# Patient Record
Sex: MALE | Race: WHITE
[De-identification: names, ages, dates, MRNs, and addresses within clinical notes are randomized per-mention and may not be internally consistent; named-entity substitution may affect disease eponyms.]

---

## 2019-11-06 ENCOUNTER — HOSPITAL ENCOUNTER (OUTPATIENT)
Dept: HOSPITAL 52 - LL.DI | Age: 74
End: 2019-11-06
Attending: PHYSICIAN ASSISTANT
Payer: COMMERCIAL

## 2019-11-06 DIAGNOSIS — M25.511: Primary | ICD-10-CM

## 2019-11-06 DIAGNOSIS — G89.29: ICD-10-CM

## 2019-11-06 DIAGNOSIS — M19.011: ICD-10-CM

## 2021-05-10 ENCOUNTER — HOSPITAL ENCOUNTER (EMERGENCY)
Dept: HOSPITAL 52 - LL.ED | Age: 76
Discharge: HOME | End: 2021-05-10
Payer: OTHER GOVERNMENT

## 2021-05-10 DIAGNOSIS — Z23: ICD-10-CM

## 2021-05-10 DIAGNOSIS — Z79.82: ICD-10-CM
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
## 2021-05-10 DIAGNOSIS — Z79.899: ICD-10-CM

## 2021-05-10 DIAGNOSIS — S51.811A: Primary | ICD-10-CM

## 2021-05-10 RX ADMIN — NEOMYCIN AND POLYMYXIN B SULFATES AND BACITRACIN ZINC ONE: 400; 3.5; 5 OINTMENT TOPICAL at 10:53

## 2021-05-10 RX ADMIN — NEOMYCIN AND POLYMYXIN B SULFATES AND BACITRACIN ZINC ONE EACH: 400; 3.5; 5 OINTMENT TOPICAL at 10:52

## 2021-05-10 RX ADMIN — TETANUS TOXOID, REDUCED DIPHTHERIA TOXOID AND ACELLULAR PERTUSSIS VACCINE, ADSORBED ONE ML: 5; 2.5; 8; 8; 2.5 SUSPENSION INTRAMUSCULAR at 10:36

## 2021-05-10 NOTE — EDM.PDOC
ED HPI GENERAL MEDICAL PROBLEM





- General


Chief Complaint: Upper Extremity Injury/Pain


Stated Complaint: right arm injury


Time Seen by Provider: 05/10/21 09:07


Source of Information: Reports: Patient


History Limitations: Reports: No Limitations





- History of Present Illness


INITIAL COMMENTS - FREE TEXT/NARRATIVE: 





Cut right forearm with new  around 11:30 last night.  Wife had him 

come in today to get the wound checked as it was "kind of deep". 


Treatments PTA: Reports: Dressing(s)


  ** Right Arm


Pain Score (Numeric/FACES): 3





- Related Data


                                    Allergies











Allergy/AdvReac Type Severity Reaction Status Date / Time


 


No Known Allergies Allergy   Verified 05/10/21 08:49











Home Meds: 


                                    Home Meds





Aspirin 325 mg PO DAILY 03/17/18 [History]


Meloxicam 15 mg PO DAILY 03/17/18 [History]


Pramipexole [Mirapex] 1 mg PO BID 03/17/18 [History]


Rosuvastatin [Crestor] 20 mg PO DAILY 03/17/18 [History]


Omeprazole 20 mg PO DAILY 05/10/21 [History]











Past Medical History


HEENT History: Reports: Impaired Vision





Review of Systems





- Review of Systems


Review Of Systems: See Below


Constitutional: Reports: No Symptoms


Skin: Reports: Other (right arm laceration)


Neurological: Reports: No Symptoms.  Denies: Numbness, Tingling, Weakness


Psychiatric: Reports: No Symptoms





ED EXAM, GENERAL





- Physical Exam


Exam: See Below


General Appearance: Alert, WD/WN, No Apparent Distress


Eye Exam: Bilateral Eye: EOMI, PERRL


Ears: Hearing Grossly Normal


Head: Atraumatic, Normocephalic


Neck: Supple


Respiratory/Chest: No Respiratory Distress


Extremities: Other (2.5 cm laceration inner right forearm/mid section   No 

deformity. Tendon function intact. )


Neurological: Alert, Oriented, Normal Cognition, Normal Gait, No Motor/Sensory 

Deficits


Psychiatric: Normal Affect, Normal Mood


Skin Exam: Warm





ED TRAUMA EXTREMITY PROCEDURES





- Laceration/Wound Repair


  ** Right Lower Ventral Arm


Lac/Wound Length In cm: 2.5


Appearance: Subcutaneous, Linear, Clean


Distal NVT: Neuro & Vascular Intact, No Tendon Injury


Anesthetic Type: Local


Local Anesthesia - Lidocaine (Xylocaine): 1% Plain


Local Anesthetic Volume: 2cc


Skin Prep: Providone-Iodine (Betadine)


Exploration/Debridement/Repair: Wound Explored, In a Bloodless Field, No Foreign

 Material Found


Closed With: Sutures


Suture Size: 3-0


# of Sutures: 3


Suture Type: Nylon, Interrupted


Sterile Dressing Applied: Nurse


Tetanus Status Addressed: Yes


Complications: No





Course





- Vital Signs


Last Recorded V/S: 


                                Last Vital Signs











Temp  36.6 C   05/10/21 08:53


 


Pulse  58 L  05/10/21 08:53


 


Resp  18   05/10/21 08:53


 


BP  118/64   05/10/21 08:53


 


Pulse Ox  95   05/10/21 08:53














- Orders/Labs/Meds


Orders: 


                               Active Orders 24 hr











 Category Date Time Status


 


 Vaccines to be Administered [RC] PER UNIT ROUTINE Care  05/10/21 10:34 Active











Meds: 


Medications














Discontinued Medications














Generic Name Dose Route Start Last Admin





  Trade Name Chana  PRN Reason Stop Dose Admin


 


Diphtheria/Tetanus/Acell Pertussis  0.5 ml  05/10/21 10:33  05/10/21 10:36





  Diphtheria,Pertussis(Acell),Tetanus Vaccine 0.5 Ml Syringe  IM  05/10/21 10:34

  0.5 ml





  .ONCE ONE   Administration


 


Lidocaine HCl  5 ml  05/10/21 10:38  05/10/21 10:42





  Lidocaine 1% 5 Ml Sdv  INJECT  05/10/21 10:39  5 ml





  ONETIME ONE   Administration


 


Neomycin/Polymyxin/Bacitracin  1 each  05/10/21 10:51  05/10/21 10:52





  Bacitracin/Neomycin/Polymyxin B Oint 0.9 Gm U/D Packet  TOP  05/10/21 10:52  1

 each





  ONETIME ONE   Administration


 


Neomycin/Polymyxin/Bacitracin  Confirm  05/10/21 10:51  05/10/21 10:53





  Bacitracin/Neomycin/Polymyxin B Oint 0.9 Gm U/D Packet  Administered  05/10/21

 10:52  Not Given





  Dose  





  1 each  





  .ROUTE  





  .STK-MED ONE  














- Re-Assessments/Exams


Free Text/Narrative Re-Assessment/Exam: 





05/10/21 11:10


Wound less than 12 hours old.  Soaked extensively in Betadine solution. Three 

interrupted sutures placed for wound closure. Tetanus updated. Wound care 

reviewed. Signs of infection reviewed. To have sutures out next Monday. To 

follow up this week as needed if any concerns/signs of infection develop. 





Departure





- Departure


Time of Disposition: 11:04


Disposition: Home, Self-Care 01


Condition: Good


Clinical Impression: 


Forearm laceration


Qualifiers:


 Encounter type: initial encounter Laterality: right Qualified Code(s): S51.811A

 - Laceration without foreign body of right forearm, initial encounter








- Discharge Information


*COPY OF PRESCRIPTION DRUG MONITORING REPORT IN PATIENT TANNA: Not Applicable


Instructions:  Laceration Care, Adult, Easy-to-Read


Referrals: 


PCP,Unknown [Primary Care Provider] - 


Forms:  ED Department Discharge


Additional Instructions: 


Sutures out next Monday!  We take them out for no additional cost at the 

hospital clinic. Call and arrange time to come in.  Follow up as needed if you 

have any problems/concerns such as signs of infection as we discussed.  Call if 

you have questions.  





Sepsis Event Note (ED)





- Evaluation


Sepsis Screening Result: No Definite Risk





- Focused Exam


Vital Signs: 


                                   Vital Signs











  Temp Pulse Resp BP Pulse Ox


 


 05/10/21 08:53  36.6 C  58 L  18  118/64  95














- My Orders


Last 24 Hours: 


My Active Orders





05/10/21 10:34


Vaccines to be Administered [RC] PER UNIT ROUTINE 














- Assessment/Plan


Last 24 Hours: 


My Active Orders





05/10/21 10:34


Vaccines to be Administered [RC] PER UNIT ROUTINE

## 2021-09-18 ENCOUNTER — HOSPITAL ENCOUNTER (EMERGENCY)
Dept: HOSPITAL 52 - LL.ED | Age: 76
Discharge: HOME | End: 2021-09-18
Payer: OTHER GOVERNMENT

## 2021-09-18 DIAGNOSIS — Z79.899: ICD-10-CM

## 2021-09-18 DIAGNOSIS — U07.1: Primary | ICD-10-CM

## 2021-09-18 DIAGNOSIS — Z79.82: ICD-10-CM

## 2021-09-18 LAB
ANION GAP SERPL CALC-SCNC: 7.4 MEQ/L (ref 7–15)
CHLORIDE SERPL-SCNC: 105 MMOL/L (ref 98–107)
SODIUM SERPL-SCNC: 141 MMOL/L (ref 136–145)

## 2021-09-18 PROCEDURE — U0002 COVID-19 LAB TEST NON-CDC: HCPCS

## 2021-09-18 NOTE — EDM.PDOC
ED HPI GENERAL MEDICAL PROBLEM





- General


Chief Complaint: General


Stated Complaint: SOB, fevers


Time Seen by Provider: 09/18/21 18:45


Source of Information: Reports: Patient, Family


History Limitations: Reports: No Limitations





- History of Present Illness


INITIAL COMMENTS - FREE TEXT/NARRATIVE: 





He presents to the emergency department for evaluation of fever and not feeling 

well.  Been feeling very hot and measured a temperature of 99.8 at home.  He 

reports headache, nasal congestion, sore throat, cough with whitish sputum, 

diffuse body aches and fatigue.  No nausea, vomiting or diarrhea.  No dysuria, 

urgency or frequency.  Symptoms of been present for 3 days.  [facility]  He talked with them earlier this afternoon and was told to come to the 

ER.  He has a history of coronary artery disease.


  ** Generalized


Pain Score (Numeric/FACES): 5





- Related Data


                                    Allergies











Allergy/AdvReac Type Severity Reaction Status Date / Time


 


No Known Allergies Allergy   Verified 09/18/21 18:58











Home Meds: 


                                    Home Meds





Aspirin 325 mg PO DAILY 03/17/18 [History]


Meloxicam 15 mg PO DAILY 03/17/18 [History]


Pramipexole [Mirapex] 1 mg PO BID 03/17/18 [History]


Rosuvastatin [Crestor] 20 mg PO DAILY 03/17/18 [History]


Omeprazole 20 mg PO DAILY 05/10/21 [History]











Past Medical History


HEENT History: Reports: Hard of Hearing, Impaired Vision





ED ROS GENERAL





- Review of Systems


Review Of Systems: See Below


Constitutional: Reports: Fever, Chills


HEENT: Reports: Ear Pain, Nose Pain, Throat Pain


Respiratory: Reports: Shortness of Breath, Cough


Cardiovascular: Denies: Chest Pain, Palpitations


Endocrine: Reports: Fatigue


GI/Abdominal: Denies: Abdominal Pain, Diarrhea, Nausea, Vomiting


: Denies: Dysuria, Frequency, Urgency


Musculoskeletal: Reports: Muscle Pain


Skin: Reports: No Symptoms


Neurological: Denies: Confusion, Dizziness





ED EXAM, GENERAL





- Physical Exam


Exam: See Below


Exam Limited By: No Limitations


General Appearance: Alert, WD/WN, No Apparent Distress


Ears: Normal External Exam, Normal Canal, Hearing Grossly Normal, Normal TMs


Nose: Normal Inspection, Normal Mucosa, No Blood


Throat/Mouth: Normal Inspection, Normal Lips, Normal Teeth, Normal Gums, Normal 

Oropharynx, No Airway Compromise


Head: Atraumatic, Normocephalic


Neck: Supple.  No: Lymphadenopathy (L), Lymphadenopathy (R)


Respiratory/Chest: No Respiratory Distress, Lungs Clear, Normal Breath Sounds


Cardiovascular: Regular Rate, Rhythm, No Murmur


GI/Abdominal: Normal Bowel Sounds, Soft, Non-Tender


  ** #1 Interpretation


EKG Date: 09/18/21


Time: 20:00


EKG Interpretation Comments: 





Accelerated junctional rhythm.  No ST or T wave changes.  No apparent acute 

process.  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





Course





- Vital Signs


Last Recorded V/S: 


                                Last Vital Signs











Temp  37.1 C   09/18/21 17:56


 


Pulse  64   09/18/21 19:28


 


Resp  31 H  09/18/21 19:28


 


BP  136/63   09/18/21 19:28


 


Pulse Ox  91 L  09/18/21 19:28














- Orders/Labs/Meds


Orders: 


                               Active Orders 24 hr











 Category Date Time Status


 


 EKG Documentation Completion [RC] ASDIRECTED Care  09/18/21 19:46 Active


 


 Chest 1V Frontal [CR] Stat Exams  09/18/21 19:45 Taken


 


 Isolation [COMM] Routine Oth  09/18/21 18:58 Active











Labs: 


                                Laboratory Tests











  09/18/21 09/18/21 09/18/21 Range/Units





  18:57 19:10 19:10 


 


WBC   6.0   (4.0-10.2)  K/uL


 


RBC   3.89 L   (4.33-5.41)  M/uL


 


Hgb   11.8 L   (13.1-16.8)  g/dL


 


Hct   36.4 L   (39.0-49.0)  %


 


MCV   93.6   (84.0-98.0)  fL


 


MCH   30.3   (28.2-33.3)  pg


 


MCHC   32.4   (31.7-36.0)  g/dL


 


RDW   15.6 H   (11.2-14.1)  %


 


Plt Count   128 L   (150-350)  K/uL


 


Neut % (Auto)   77.4   (45.0-80.0)  %


 


Lymph % (Auto)   14.9   (10.0-50.0)  %


 


Mono % (Auto)   7.5   (2.0-14.0)  %


 


Eos % (Auto)   0.0   (0.0-5.0)  %


 


Baso % (Auto)   0.2   (0.0-2.0)  %


 


Neut # (Auto)   4.67   (1.40-7.00)  K/uL


 


Lymph # (Auto)   0.90   (0.50-3.50)  K/uL


 


Mono # (Auto)   0.45   (0.00-1.00)  K/uL


 


Eos # (Auto)   0.00   (0.00-0.50)  K/uL


 


Baso # (Auto)   0.01   (0.00-0.20)  K/uL


 


Sodium    141  (136-145)  mmol/L


 


Potassium    3.9  (3.5-5.1)  mmol/L


 


Chloride    105  ()  mmol/L


 


Carbon Dioxide    28.6  (21.0-32.0)  mmol/L


 


Anion Gap    7.4  (7-15)  meq/L


 


BUN    18  (7-18)  mg/dL


 


Creatinine    1.11  (0.51-1.17)  mg/dL


 


Est Cr Clr Drug Dosing    59.27  mL/min


 


Estimated GFR (MDRD)    > 60  mL/min


 


Glucose    86  (70-99)  mg/dL


 


Calcium    8.2 L  (8.5-10.1)  mg/dL


 


Total Bilirubin    0.9  (0.2-1.0)  mg/dL


 


AST    53 H  (15-37)  U/L


 


ALT    56  (12-78)  U/L


 


Alkaline Phosphatase    53  ()  IU/L


 


Troponin I High Sens     (<=76)  ng/L


 


NT-Pro-B Natriuret Pep     (0-125)  pg/mL


 


Total Protein    6.7  (6.4-8.2)  g/dL


 


Albumin    3.4  (3.4-5.0)  g/dL


 


SARS-CoV-2 RNA (NGA)  Positive H    (NEGATIVE)  














  09/18/21 Range/Units





  19:10 


 


WBC   (4.0-10.2)  K/uL


 


RBC   (4.33-5.41)  M/uL


 


Hgb   (13.1-16.8)  g/dL


 


Hct   (39.0-49.0)  %


 


MCV   (84.0-98.0)  fL


 


MCH   (28.2-33.3)  pg


 


MCHC   (31.7-36.0)  g/dL


 


RDW   (11.2-14.1)  %


 


Plt Count   (150-350)  K/uL


 


Neut % (Auto)   (45.0-80.0)  %


 


Lymph % (Auto)   (10.0-50.0)  %


 


Mono % (Auto)   (2.0-14.0)  %


 


Eos % (Auto)   (0.0-5.0)  %


 


Baso % (Auto)   (0.0-2.0)  %


 


Neut # (Auto)   (1.40-7.00)  K/uL


 


Lymph # (Auto)   (0.50-3.50)  K/uL


 


Mono # (Auto)   (0.00-1.00)  K/uL


 


Eos # (Auto)   (0.00-0.50)  K/uL


 


Baso # (Auto)   (0.00-0.20)  K/uL


 


Sodium   (136-145)  mmol/L


 


Potassium   (3.5-5.1)  mmol/L


 


Chloride   ()  mmol/L


 


Carbon Dioxide   (21.0-32.0)  mmol/L


 


Anion Gap   (7-15)  meq/L


 


BUN   (7-18)  mg/dL


 


Creatinine   (0.51-1.17)  mg/dL


 


Est Cr Clr Drug Dosing   mL/min


 


Estimated GFR (MDRD)   mL/min


 


Glucose   (70-99)  mg/dL


 


Calcium   (8.5-10.1)  mg/dL


 


Total Bilirubin   (0.2-1.0)  mg/dL


 


AST   (15-37)  U/L


 


ALT   (12-78)  U/L


 


Alkaline Phosphatase   ()  IU/L


 


Troponin I High Sens  33  (<=76)  ng/L


 


NT-Pro-B Natriuret Pep  215 H  (0-125)  pg/mL


 


Total Protein   (6.4-8.2)  g/dL


 


Albumin   (3.4-5.0)  g/dL


 


SARS-CoV-2 RNA (NGA)   (NEGATIVE)  














- Radiology Interpretation


Free Text/Narrative:: 





Portable chest x-ray shows normal cardiac silhouette.  No significant 

infiltrates.  No free air.





- Re-Assessments/Exams


Free Text/Narrative Re-Assessment/Exam: 


Patient was placed on oxygen and did feel his breathing improved, although he 

was saturating in the low 90s on arrival.  Because of his complaining of some 

tightness in the chest did check EKG and cardiac enzymes which were 

unremarkable.  A BNP was slightly elevated.  Covid test was positive.  Influenza

 was negative.  Labs were otherwise unremarkable.  Will discharge to home with 

home quarantine.





Departure





- Departure


Time of Disposition: 20:35


Disposition: Home, Self-Care 01


Condition: Good


Clinical Impression: 


 COVID-19








- Discharge Information


*PRESCRIPTION DRUG MONITORING PROGRAM REVIEWED*: Not Applicable


*COPY OF PRESCRIPTION DRUG MONITORING REPORT IN PATIENT TANNA: Not Applicable


Instructions:  COVID-19 Frequently Asked Questions


Forms:  ED Department Discharge


Care Plan Goals: 


Quarantine at home until symptoms have been gone for 1 week.


Push fluids.


Resume usual home medications.


Call or follow-up if symptoms are worsening.





Sepsis Event Note (ED)





- Evaluation


Sepsis Screening Result: No Definite Risk





- Focused Exam


Vital Signs: 


                                   Vital Signs











  Temp Pulse Resp BP Pulse Ox


 


 09/18/21 19:28   64  31 H  136/63  91 L


 


 09/18/21 17:56  37.1 C  68  28 H  117/52 L  92 L














- Problem List & Annotations


(1) COVID-19


SNOMED Code(s): 845066059


   Code(s): U07.1 - COVID-19   Status: Acute   Current Visit: Yes   





- My Orders


Last 24 Hours: 


My Active Orders





09/18/21 18:58


Isolation [COMM] Routine 





09/18/21 19:45


Chest 1V Frontal [CR] Stat 





09/18/21 19:46


EKG Documentation Completion [RC] ASDIRECTED 














- Assessment/Plan


Last 24 Hours: 


My Active Orders





09/18/21 18:58


Isolation [COMM] Routine 





09/18/21 19:45


Chest 1V Frontal [CR] Stat 





09/18/21 19:46


EKG Documentation Completion [RC] ASDIRECTED 











Plan: 





Quarantine at home until symptoms have been gone for 1 week.


Push fluids.


Resume usual home medications.


Call or follow-up if symptoms are worsening.

## 2021-09-20 ENCOUNTER — HOSPITAL ENCOUNTER (INPATIENT)
Dept: HOSPITAL 52 - LL.ACU | Age: 76
LOS: 1 days | Discharge: NURSING, REHAB OR LONG-TERM CARE | DRG: 178 | End: 2021-09-21
Attending: FAMILY MEDICINE | Admitting: FAMILY MEDICINE
Payer: OTHER GOVERNMENT

## 2021-09-20 DIAGNOSIS — I25.810: ICD-10-CM

## 2021-09-20 DIAGNOSIS — G47.30: ICD-10-CM

## 2021-09-20 DIAGNOSIS — R09.02: ICD-10-CM

## 2021-09-20 DIAGNOSIS — Z23: ICD-10-CM

## 2021-09-20 DIAGNOSIS — I25.2: ICD-10-CM

## 2021-09-20 DIAGNOSIS — F43.10: ICD-10-CM

## 2021-09-20 DIAGNOSIS — Z79.52: ICD-10-CM

## 2021-09-20 DIAGNOSIS — G25.81: ICD-10-CM

## 2021-09-20 DIAGNOSIS — U07.1: Primary | ICD-10-CM

## 2021-09-20 DIAGNOSIS — Z99.81: ICD-10-CM

## 2021-09-20 DIAGNOSIS — K21.9: ICD-10-CM

## 2021-09-20 DIAGNOSIS — E78.5: ICD-10-CM

## 2021-09-20 DIAGNOSIS — M79.7: ICD-10-CM

## 2021-09-20 DIAGNOSIS — H54.7: ICD-10-CM

## 2021-09-20 DIAGNOSIS — M19.90: ICD-10-CM

## 2021-09-20 DIAGNOSIS — H91.90: ICD-10-CM

## 2021-09-20 DIAGNOSIS — E66.9: ICD-10-CM

## 2021-09-20 DIAGNOSIS — Z79.82: ICD-10-CM

## 2021-09-20 DIAGNOSIS — I10: ICD-10-CM

## 2021-09-20 DIAGNOSIS — Z79.899: ICD-10-CM

## 2021-09-20 DIAGNOSIS — M33.20: ICD-10-CM

## 2021-09-20 LAB
ANION GAP SERPL CALC-SCNC: 8.4 MEQ/L (ref 7–15)
CHLORIDE SERPL-SCNC: 105 MMOL/L (ref 98–107)
SODIUM SERPL-SCNC: 140 MMOL/L (ref 136–145)

## 2021-09-20 PROCEDURE — 3E0DX3Z INTRODUCTION OF ANTI-INFLAMMATORY INTO MOUTH AND PHARYNX, EXTERNAL APPROACH: ICD-10-PCS | Performed by: FAMILY MEDICINE

## 2021-09-20 PROCEDURE — XW033F6 INTRODUCTION OF BAMLANIVIMAB MONOCLONAL ANTIBODY INTO PERIPHERAL VEIN, PERCUTANEOUS APPROACH, NEW TECHNOLOGY GROUP 6: ICD-10-PCS | Performed by: FAMILY MEDICINE

## 2021-09-20 PROCEDURE — XW033E5 INTRODUCTION OF REMDESIVIR ANTI-INFECTIVE INTO PERIPHERAL VEIN, PERCUTANEOUS APPROACH, NEW TECHNOLOGY GROUP 5: ICD-10-PCS | Performed by: FAMILY MEDICINE

## 2021-09-20 RX ADMIN — IPRATROPIUM BROMIDE AND ALBUTEROL SCH INHALATION: 20; 100 SPRAY, METERED RESPIRATORY (INHALATION) at 16:25

## 2021-09-20 RX ADMIN — FLUTICASONE PROPIONATE SCH SPRAY: 50 SPRAY, METERED NASAL at 19:39

## 2021-09-20 RX ADMIN — IPRATROPIUM BROMIDE AND ALBUTEROL SCH INHALATION: 20; 100 SPRAY, METERED RESPIRATORY (INHALATION) at 19:39

## 2021-09-20 NOTE — PCM.HP.2
H&P History of Present Illness





- General


Date of Service: 09/20/21


Admit Problem/Dx: 


                           Admission Diagnosis/Problem





Admission Diagnosis/Problem      Shortness of breath








Source of Information: Patient


History Limitations: Reports: No Limitations





- History of Present Illness


Initial Comments - Free Text/Narative: 





He was seen in the emergency department 3 nights ago and is positive for Covid. 

He has shortness of breath.  He came in today for monoclonal antibodies but his 

O2 sat was in the 70s.  In discussion with him his breathing does seem somewhat 

worse, especially at night.  Decision was made to admit to the hospital.  He 

does have a history of coronary artery disease and hypertension.  See ED note 

from September 17 for any additional details.





- Related Data


Allergies/Adverse Reactions: 


                                    Allergies











Allergy/AdvReac Type Severity Reaction Status Date / Time


 


No Known Allergies Allergy   Verified 09/18/21 18:58











Home Medications: 


                                    Home Meds





Aspirin 325 mg PO DAILY 03/17/18 [History]


Meloxicam 15 mg PO DAILY 03/17/18 [History]


Pramipexole [Mirapex] 1 mg PO BID 03/17/18 [History]


Rosuvastatin [Crestor] 20 mg PO DAILY 03/17/18 [History]


Omeprazole 20 mg PO DAILY 05/10/21 [History]


Budesonide/Formoterol Fumarate [Symbicort 80-4.5 MCG] 2 puff INH BEDTIME 

09/20/21 [History]


Fluticasone Propionate [Flovent] 2 spray NASBOTH BEDTIME 09/20/21 [History]


Isosorbide Mononitrate [Imdur] 30 mg PO DAILY 09/20/21 [History]


Nitroglycerin 1 tab SL ASDIRECTED PRN 09/20/21 [History]


amLODIPine [Norvasc] 10 mg PO DAILY 09/20/21 [History]


predniSONE [Prednisone] 7.5 mg PO DAILY 09/20/21 [History]











Past Medical History


HEENT History: Reports: Hard of Hearing, Impaired Vision


Cardiovascular History: Reports: MI


Respiratory History: Reports: Sleep Apnea, Other (See Below)


Other Respiratory History: wears CPAP at HS





- Past Surgical History


Cardiovascular Surgical History: Reports: Coronary Artery Bypass, Other (See 

Below)


Other Cardiovascular Surgeries/Procedures: open hrt surgery 4 bypass, MI 2019


Musculoskeletal Surgical History: Reports: Shoulder Surgery, Other (See Below)


Other Musculoskeletal Surgeries/Procedures:: 2021 revervse rotator cuff of right

 shoulder





H&P Review of Systems





- Review of Systems:


Review Of Systems: See Below


General: Denies: Fever, Chills


HEENT: Denies: Headaches, Visual Changes


Pulmonary: Reports: Shortness of Breath, Cough


Cardiovascular: Denies: Chest Pain, Palpitations


Gastrointestinal: Denies: Abdominal Pain, Nausea, Vomiting


Genitourinary: Denies: Dysuria, Frequency, Urgency


Neurological: Denies: Confusion, Dizziness, Headache





Exam





- Exam


Exam: See Below





- Exam


General: Alert, Oriented


HEENT: Conjunctiva Clear


Neck: Trachea Midline


Lungs: Other (Breathing is slightly decreased but good air movement throughout 

and clear to auscultation.)


Cardiovascular: Regular Rate, Regular Rhythm


GI/Abdominal Exam: Normal Bowel Sounds, Soft, Non-Tender, No Mass





- Patient Data


Result Diagrams: 


                                 09/20/21 14:20





                                 09/20/21 14:20





*Q Meaningful Use (ADM)





- VTE *Q


VTE Criteria *Q: 


01








- Problem List


(1) Coronary artery disease


SNOMED Code(s): 16321044


   ICD Code: I25.10 - ATHSCL HEART DISEASE OF NATIVE CORONARY ARTERY W/O ANG 

PCTRS   Status: Acute   Current Visit: Yes   





(2) Essential hypertension


SNOMED Code(s): 49575027


   ICD Code: I10 - ESSENTIAL (PRIMARY) HYPERTENSION   Status: Acute   Current 

Visit: Yes   





(3) COVID-19


SNOMED Code(s): 647862930


   ICD Code: U07.1 - COVID-19   Status: Acute   Current Visit: No   


Problem List Initiated/Reviewed/Updated: Yes


Orders Last 24hrs: 


                               Active Orders 24 hr











 Category Date Time Status


 


 Patient Status [ADT] Routine ADT  09/20/21 13:12 Ordered


 


 Antiembolic Devices [RC] .Routine Care  09/20/21 13:18 Ordered


 


 Height and Weight [RC] DAILY Care  09/20/21 13:11 Ordered


 


 Intake and Output [RC] QSHIFT Care  09/20/21 13:16 Ordered


 


 Oxygen Therapy [RC] CONTINUOUS Care  09/20/21 13:16 Ordered


 


 Pulse Oximetry [RC] CONTINUOUS Care  09/20/21 13:16 Ordered


 


 Up ad Mary [RC] ASDIRECTED Care  09/20/21 13:11 Ordered


 


 VTE/DVT Education [RC] PER UNIT ROUTINE Care  09/20/21 13:18 Ordered


 


 Vital Signs [RC] Q15M Care  09/20/21 11:30 Active


 


 Vital Signs [RC] Q4H Care  09/20/21 13:12 Ordered


 


 Regular Diet [DIET] Diet  09/20/21 Lunch Ordered


 


 BILIRUBIN DIRECT [CHEM] DAILY Lab  09/20/21 13:30 Ordered


 


 BILIRUBIN DIRECT [CHEM] DAILY Lab  09/21/21 13:30 Ordered


 


 BILIRUBIN DIRECT [CHEM] DAILY Lab  09/22/21 13:30 Ordered


 


 BILIRUBIN DIRECT [CHEM] DAILY Lab  09/23/21 13:30 Ordered


 


 BILIRUBIN DIRECT [CHEM] DAILY Lab  09/24/21 13:30 Ordered


 


 CBC WITH AUTO DIFF [HEME] Routine Lab  09/20/21 13:11 Ordered


 


 COMPREHENSIVE METABOLIC PN,CMP [CHEM] DAILY Lab  09/20/21 13:30 Ordered


 


 COMPREHENSIVE METABOLIC PN,CMP [CHEM] DAILY Lab  09/21/21 13:30 Ordered


 


 COMPREHENSIVE METABOLIC PN,CMP [CHEM] DAILY Lab  09/22/21 13:30 Ordered


 


 COMPREHENSIVE METABOLIC PN,CMP [CHEM] DAILY Lab  09/23/21 13:30 Ordered


 


 COMPREHENSIVE METABOLIC PN,CMP [CHEM] DAILY Lab  09/24/21 13:30 Ordered


 


 INR,PT,PROTHROMBIN TIME [COAG] Routine Lab  09/20/21 13:11 Ordered


 


 Acetaminophen [TylenoL] Med  09/20/21 13:11 Ordered





 650 mg PO Q4H PRN   


 


 Calcium Carbonate [Tums] Med  09/20/21 13:11 Ordered





 500 mg PO Q4H PRN   


 


 EPINEPHrine [Adrenalin] Med  09/20/21 11:30 Active





 0.3 mg IM ONETIME PRN   


 


 Enoxaparin [Lovenox] Med  09/20/21 13:15 Ordered





 40 mg SUBCUT Q12H   


 


 Famotidine [Pepcid] Med  09/20/21 11:30 Active





 20 mg IVPUSH ONETIME PRN   


 


 Remdesivir 200 mg Med  09/20/21 13:21 Ordered





 Sodium Chloride 0.9% [Normal Saline] 250 ml   





 IV ONETIME   


 


 Sodium Chloride 0.9% [Saline Flush] Med  09/20/21 11:30 Active





 30 ml FLUSH ASDIRECTED   


 


 dexAMETHasone Med  09/20/21 13:30 Ordered





 6 mg PO DAILY   


 


 diphenhydrAMINE [Benadryl] Med  09/20/21 11:30 Active





 50 mg IVPUSH ONETIME PRN   


 


 methylPREDNISolone Sod Succ [Solu-MEDROL] Med  09/20/21 11:30 Active





 125 mg IVPUSH ONETIME PRN   


 


 DVT/VTE Prophylaxis Reflex [OM.PC] Routine Oth  09/20/21 13:11 Ordered


 


 Resuscitation Status Routine Resus Stat  09/20/21 13:11 Ordered








                                Medication Orders





Diphenhydramine HCl (Diphenhydramine 50 Mg/Ml Sdv)  50 mg IVPUSH ONETIME PRN


   PRN Reason: hypersensitivity reaction


Epinephrine HCl (Epinephrine 1 Mg/1 Ml Amp)  0.3 mg IM ONETIME PRN


   PRN Reason: hypersensitivity reaction


Famotidine (Famotidine 20 Mg/2 Ml Sdv)  20 mg IVPUSH ONETIME PRN


   PRN Reason: hypersensitivity reaction


Methylprednisolone Sodium Succinate (Methylprednisolone Sodium Succinate 125 

Mg/2 Ml Sdv)  125 mg IVPUSH ONETIME PRN


   PRN Reason: hypersensitivity reaction


Sodium Chloride (Sodium Chloride 0.9% 10 Ml Syringe)  30 ml FLUSH ASDIRECTED FELICIANO








Assessment/Plan Comment:: 





He is admitted as an inpatient for supplemental oxygen.  Keep O2 sats above 90%.


We will start remdesivir 20 mg today then 100 mg daily.


Dexamethasone 6 mg daily.


Resume usual home medications, but will stop the Medrol he was started on 3 days

 ago.

## 2021-09-21 LAB
ANION GAP SERPL CALC-SCNC: 10.3 MEQ/L (ref 7–15)
CHLORIDE SERPL-SCNC: 106 MMOL/L (ref 98–107)
SODIUM SERPL-SCNC: 139 MMOL/L (ref 136–145)

## 2021-09-21 RX ADMIN — Medication SCH ML: at 20:11

## 2021-09-21 RX ADMIN — IPRATROPIUM BROMIDE AND ALBUTEROL SCH INHALATION: 20; 100 SPRAY, METERED RESPIRATORY (INHALATION) at 08:24

## 2021-09-21 RX ADMIN — FLUTICASONE PROPIONATE SCH: 50 SPRAY, METERED NASAL at 20:03

## 2021-09-21 RX ADMIN — Medication SCH ML: at 16:12

## 2021-09-21 RX ADMIN — IPRATROPIUM BROMIDE AND ALBUTEROL SCH INHALATION: 20; 100 SPRAY, METERED RESPIRATORY (INHALATION) at 12:13

## 2021-09-21 RX ADMIN — IPRATROPIUM BROMIDE AND ALBUTEROL SCH INHALATION: 20; 100 SPRAY, METERED RESPIRATORY (INHALATION) at 16:09

## 2021-09-21 NOTE — [id number]
**Discharge Summary





- Hospital Course


Brief History: Patient admitted for treatment of significant hypoxemia 

associated with acute Covid infection


Diagnosis: Stroke: No





- Discharge Data


Discharge Date: 09/21/21


Discharge Disposition: DC/Tfer to Acute Hospital 02


Condition: Good





- Referral to Home Health


Primary Care Physician: 


PCP None








- Discharge Diagnosis/Problem(s)


(1) COVID-19


SNOMED Code(s): 739334637


   ICD Code: U07.1 - COVID-19   Status: Acute   Priority: High   Current Visit: 

No   Problem Details: Acute Covid-19 infection. Receiving Dexamethasone. IV 

Bamlanivimab and Remdesivir.  Supplemental O2.    





(2) Hypoxemia requiring supplemental oxygen


SNOMED Code(s): 129171884


   ICD Code: R09.02 - HYPOXEMIA; Z99.81 - DEPENDENCE ON SUPPLEMENTAL OXYGEN   

Status: Acute   Priority: High   Current Visit: Yes   Problem Details: Receiving

15L via non-rebreather mask this morning.  Regular DuoNebs/Dexamethasone 

scheduled.    





(3) Essential hypertension


SNOMED Code(s): 69813881


   ICD Code: I10 - ESSENTIAL (PRIMARY) HYPERTENSION   Status: Chronic   

Priority: Medium   Current Visit: Yes   Problem Details: Has been running a bit 

lower BPs since admission.  /50s.  BP meds put on hold earlier today    





(4) Coronary artery disease


SNOMED Code(s): 94376818


   ICD Code: I25.10 - ATHSCL HEART DISEASE OF NATIVE CORONARY ARTERY W/O ANG 

PCTRS   Status: Chronic   Priority: Low   Current Visit: No   Problem Details: 

No chest pain/anginal complaints.    


Qualifiers: 


   Coronary Disease-Associated Artery/Lesion type: bypass graft   Native vs. 

transplanted heart: native heart   Associated angina: unspecified whether angina

present   Qualified Code(s): I25.810 - Atherosclerosis of coronary artery bypass

graft(s) without angina pectoris   





(5) GERD (gastroesophageal reflux disease)


SNOMED Code(s): 347503504


   ICD Code: K21.9 - GASTRO-ESOPHAGEAL REFLUX DISEASE WITHOUT ESOPHAGITIS   

Status: Chronic   Priority: Low   Current Visit: No   Problem Details: Stable by

history   


Qualifiers: 


   Esophagitis presence: esophagitis presence not specified   Qualified Code(s):

K21.9 - Gastro-esophageal reflux disease without esophagitis   





(6) Sleep apnea


SNOMED Code(s): 13218920


   ICD Code: G47.30 - SLEEP APNEA, UNSPECIFIED   Status: Acute   Priority: Low  

Current Visit: No   Problem Details: Has CPAP device with him.     


Qualifiers: 


   Sleep apnea type: unspecified type   Qualified Code(s): G47.30 - Sleep apnea,

unspecified   





(7) Hyperlipemia


SNOMED Code(s): 62837086


   ICD Code: E78.5 - HYPERLIPIDEMIA, UNSPECIFIED   Status: Chronic   Priority: 

Low   Current Visit: No   Problem Details: Under therapy   


Qualifiers: 


   Hyperlipidemia type: unspecified   Qualified Code(s): E78.5 - Hyperlipidemia,

unspecified   





(8) Osteoarthritis


SNOMED Code(s): 428611548


   ICD Code: M19.90 - UNSPECIFIED OSTEOARTHRITIS, UNSPECIFIED SITE   Status: 

Chronic   Priority: Low   Current Visit: No   Problem Details: Stable by history

  


Qualifiers: 


   Osteoarthritis location: unspecified site 





(9) Fibromyalgia


SNOMED Code(s): 210794130


   ICD Code: M79.7 - FIBROMYALGIA   Status: Chronic   Priority: Low   Current 

Visit: No   Problem Details: stable by history   





(10) Hearing loss


SNOMED Code(s): 53791483


   ICD Code: H91.90 - UNSPECIFIED HEARING LOSS, UNSPECIFIED EAR   Status: 

Chronic   Priority: Low   Current Visit: No   


Qualifiers: 


   Hearing loss type: unspecified 





(11) Obesity


SNOMED Code(s): 746785841, 728075125


   ICD Code: E66.9 - OBESITY, UNSPECIFIED   Status: Chronic   Priority: Low   

Current Visit: No   


Qualifiers: 


   Obesity type: unspecified obesity type 





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


SNOMED Code(s): 12956733


   ICD Code: M33.20 - POLYMYOSITIS, ORGAN INVOLVEMENT UNSPECIFIED   Status: 

Acute   Current Visit: Yes   





(13) PTSD (post-traumatic stress disorder)


SNOMED Code(s): 91417471


   ICD Code: F43.10 - POST-TRAUMATIC STRESS DISORDER, UNSPECIFIED   Status: 

Chronic   Priority: Low   Current Visit: No   Problem Details: Stable by history

  





(14) RLS (restless legs syndrome)


SNOMED Code(s): 76033548


   ICD Code: G25.81 - RESTLESS LEGS SYNDROME   Status: Chronic   Priority: Low  

Current Visit: No   Problem Details: stable by history   





- Patient Summary/Data


Hospital Course: 





Patient continued to require high flow O2.  Received Bamlanivimab and 

Remdesivir.  DuoNebs and Dexamethasone ordered.  BP remained a bit low and HTN 

meds placed on hold today. O2 sats noted to fall throughout day towards lower 

80s.  O2 connected to patient's CPAP and things improved briefly after this 

intervention and a DuoNeb.  However oxygen sats again started to fall.  Patient 

currently often dropping below 85%.  It was determined patient would benefit 

from higher level of care. Call placed to [facility] and patient accepted for 

transfer by . 





- Discharge Plan


Home Medications: 


                                    Home Meds





Aspirin 325 mg PO DAILY 03/17/18 [History]


Meloxicam 15 mg PO DAILY 03/17/18 [History]


Pramipexole [Mirapex] 1 mg PO BID 03/17/18 [History]


Rosuvastatin [Crestor] 20 mg PO DAILY 03/17/18 [History]


Omeprazole 20 mg PO DAILY 05/10/21 [History]


Budesonide/Formoterol Fumarate [Symbicort 80-4.5 MCG] 2 puff INH BEDTIME 

09/20/21 [History]


Fluticasone Propionate [Flovent] 2 spray NASBOTH BEDTIME 09/20/21 [History]


Isosorbide Mononitrate [Imdur] 30 mg PO DAILY 09/20/21 [History]


Nitroglycerin 1 tab SL ASDIRECTED PRN 09/20/21 [History]


amLODIPine [Norvasc] 10 mg PO DAILY 09/20/21 [History]


predniSONE [Prednisone] 7.5 mg PO DAILY 09/20/21 [History]











- Discharge Summary/Plan Comment


DC Time >30 min.: No


Total # of Minutes for Discharge Time: 





60





- Patient Data


Vitals - Most Recent: 


                                Last Vital Signs











Temp  36.4 C   09/21/21 20:00


 


Pulse  61   09/21/21 20:00


 


Resp  24 H  09/21/21 20:00


 


BP  122/58 L  09/21/21 20:00


 


Pulse Ox  94 L  09/21/21 20:17











Weight - Most Recent: 96.615 kg


I&O - Last 24 hours: 


                                 Intake & Output











 09/21/21 09/21/21 09/21/21





 06:59 14:59 22:59


 


Intake Total   460


 


Balance   460











Lab Results - Last 24 hrs: 


                         Laboratory Results - last 24 hr











  09/21/21 Range/Units





  13:33 


 


Sodium  139  (136-145)  mmol/L


 


Potassium  4.0  (3.5-5.1)  mmol/L


 


Chloride  106  ()  mmol/L


 


Carbon Dioxide  22.7  (21.0-32.0)  mmol/L


 


Anion Gap  10.3  (7-15)  meq/L


 


BUN  23 H  (7-18)  mg/dL


 


Creatinine  0.98  (0.51-1.17)  mg/dL


 


Est Cr Clr Drug Dosing  67.13  mL/min


 


Estimated GFR (MDRD)  > 60  mL/min


 


Glucose  162 H  (70-99)  mg/dL


 


Calcium  8.6  (8.5-10.1)  mg/dL


 


Total Bilirubin  0.8  (0.2-1.0)  mg/dL


 


Direct Bilirubin  0.2  (0.0-0.2)  mg/dL


 


AST  60 H  (15-37)  U/L


 


ALT  43  (12-78)  U/L


 


Alkaline Phosphatase  59  ()  IU/L


 


Total Protein  6.6  (6.4-8.2)  g/dL


 


Albumin  2.8 L  (3.4-5.0)  g/dL











Med Orders - Current: 


                               Current Medications





Acetaminophen (Acetaminophen 325 Mg Tab)  650 mg PO Q4H PRN


   PRN Reason: analgesia/fever


   Last Admin: 09/21/21 20:04 Dose:  650 mg


   Documented by: 


Albuterol (Albuterol 6.7 Gm Inhaler)  0 gm INH Q2H PRN


   PRN Reason: Shortness of Breath


Albuterol/Ipratropium (Albuterol/Ipratropium 3.0-0.5 Mg/3 Ml Neb Soln)  3 ml NEB

 Q4HRRT [facility]


   Last Admin: 09/21/21 20:03 Dose:  3 ml


   Documented by: 


Amlodipine Besylate (Amlodipine 5 Mg Tab)  10 mg PO DAILY [facility]


   Last Admin: 09/21/21 08:21 Dose:  10 mg


   Documented by: 


Benzonatate (Benzonatate 100 Mg Cap)  100 mg PO TID@0800,1400,2000 [facility]


   Last Admin: 09/21/21 20:04 Dose:  100 mg


   Documented by: 


Calcium Carbonate/Glycine (Calcium Carbonate 500 Mg Tab.Chew)  500 mg PO Q4H PRN


   PRN Reason: Dyspepsia


Dexamethasone (Dexamethasone 2 Mg Tab)  6 mg PO DAILY [facility]


   Last Admin: 09/21/21 08:24 Dose:  6 mg


   Documented by: 


Enoxaparin Sodium (Enoxaparin 40 Mg/0.4 Ml Syringe)  40 mg SUBCUT Q12HR [facility]


   Last Admin: 09/21/21 20:04 Dose:  40 mg


   Documented by: 


Fluticasone Propionate (Fluticasone Propionate Nasal Spray 16 Gm Bottle)  0 gm 

NASBOTH BEDTIME [facility]


   Last Admin: 09/21/21 20:03 Dose:  Not Given


   Documented by: 


Guaifenesin (Guaifenesin 600 Mg Tab.Er)  600 mg PO Q12HR [facility]


   Last Admin: 09/21/21 20:04 Dose:  600 mg


   Documented by: 


Remdesivir 100 mg/ Sodium (Chloride)  100 mls @ 100 mls/hr IV Q24H [facility]


   Stop: 09/24/21 15:59


   Last Admin: 09/21/21 15:01 Dose:  100 mls/hr


   Documented by: 


Isosorbide Mononitrate (Isosorbide Mononitrate 30 Mg Tab.Er)  30 mg PO DAILY [facility]


   Last Admin: 09/21/21 08:23 Dose:  30 mg


   Documented by: 


Meloxicam (Meloxicam 15 Mg Tab)  15 mg PO DAILY [facility]


   Last Admin: 09/21/21 08:22 Dose:  15 mg


   Documented by: 


Nitroglycerin (Nitroglycerin 0.4 Mg Tab.Sl)  0.4 mg SL ASDIRECTED PRN


   PRN Reason: Chest Pain


Omeprazole (Omeprazole 20 Mg Cap.Cr)  20 mg PO DAILY [facility]


   Last Admin: 09/21/21 08:23 Dose:  20 mg


   Documented by: 


Pramipexole Dihydrochloride (Pramipexole 1 Mg Tab)  1 mg PO BID@0800,2000 [facility]


   Last Admin: 09/21/21 20:04 Dose:  1 mg


   Documented by: 


Rosuvastatin Calcium (Rosuvastatin 10 Mg Tab)  20 mg PO DAILY [facility]


   Last Admin: 09/21/21 08:22 Dose:  20 mg


   Documented by: 


Sodium Chloride (Sodium Chloride 0.9% 10 Ml Syringe)  10 ml FLUSH ASDIRECTED PRN


   PRN Reason: Keep Vein Open





Discontinued Medications





Albuterol/Ipratropium (Albuterol/Ipratropium 4 Gm Inhalation Spray)  1 gm INH 

QID [facility]


   Last Admin: 09/21/21 16:09 Dose:  1 inhalation


   Documented by: 


Enoxaparin Sodium (Enoxaparin 40 Mg/0.4 Ml Syringe)  40 mg SUBCUT ONETIME ONE


   Stop: 09/20/21 15:01


   Last Admin: 09/20/21 15:13 Dose:  40 mg


   Documented by: 


Guaifenesin (Guaifenesin 600 Mg Tab.Er)  600 mg PO ONETIME ONE


   Stop: 09/20/21 16:01


   Last Admin: 09/20/21 16:27 Dose:  600 mg


   Documented by: 


Bamlanivimab 700 mg/Etesevimab 1,400 mg/ Sodium Chloride  310 mls @ 310 mls/hr 

IV ONETIME ONE


   Stop: 09/20/21 12:29


   Last Admin: 09/20/21 14:17 Dose:  Not Given


   Documented by: 


Remdesivir 200 mg/ Sodium (Chloride)  250 mls @ 250 mls/hr IV ONETIME ONE


   Stop: 09/20/21 15:59


   Last Admin: 09/20/21 15:13 Dose:  250 mls/hr


   Documented by: 


Sodium Chloride (Sodium Chloride 0.9% 10 Ml Syringe)  30 ml FLUSH ASDIRECTED [facility]


   Last Admin: 09/21/21 20:11 Dose:  10 ml


   Documented by:

## 2021-09-21 NOTE — PCM.PN
- General Info


Date of Service: 09/21/21


Admission Dx/Problem (Free Text): 


                           Admission Diagnosis/Problem





Admission Diagnosis/Problem      Shortness of breath/hypoxemia related to acute 

Covid 19 infection








Subjective Update: 





Patient feels ok when sitting.  Says symptoms of SOB/cough have not worsened 

since admission.  No new symptoms noted. 


Functional Status: Reports: Pain Controlled, Tolerating Diet, Ambulating (in 

patient care room), Urinating, Incentive Spirometry.  Denies: New Symptoms





- Review of Systems


General: Reports: Fatigue, Malaise, Appetite (poor).  Denies: Fever, Chills, 

Night Sweats


HEENT: Reports: Sinus Congestion, Rhinitis.  Denies: Dysphasia, Ear Pain, Eye 

Pain, Headaches, Sore Throat, Visual Changes


Pulmonary: Reports: Shortness of Breath, Cough, Sputum.  Denies: Pleuritic Chest

Pain, Hemoptysis, Wheezing


Cardiovascular: Reports: Dyspnea on Exertion.  Denies: Chest Pain, Edema, 

Lightheadedness


Gastrointestinal: Reports: No Symptoms


Genitourinary: Reports: No Symptoms


Musculoskeletal: Reports: No Symptoms


Skin: Reports: No Symptoms


Neurological: Reports: No Symptoms


Psychiatric: Reports: No Symptoms





- Patient Data


Vitals - Most Recent: 


                                Last Vital Signs











Temp  36.7 C   09/21/21 12:00


 


Pulse  62   09/21/21 12:00


 


Resp  32 H  09/21/21 12:00


 


BP  98/52 L  09/21/21 12:00


 


Pulse Ox  83 L  09/21/21 12:00











Weight - Most Recent: 97.522 kg


I&O - Last 24 Hours: 


                                 Intake & Output











 09/20/21 09/21/21 09/21/21





 22:59 06:59 14:59


 


Intake Total 530  


 


Balance 530  











Lab Results Last 24 Hours: 


                         Laboratory Results - last 24 hr











  09/20/21 09/20/21 09/20/21 Range/Units





  14:20 14:20 14:20 


 


WBC  8.8    (4.0-10.2)  K/uL


 


RBC  4.18 L    (4.33-5.41)  M/uL


 


Hgb  12.7 L    (13.1-16.8)  g/dL


 


Hct  38.4 L    (39.0-49.0)  %


 


MCV  91.9    (84.0-98.0)  fL


 


MCH  30.4    (28.2-33.3)  pg


 


MCHC  33.1    (31.7-36.0)  g/dL


 


RDW  15.7 H    (11.2-14.1)  %


 


Plt Count  142 L    (150-350)  K/uL


 


Neut % (Auto)  83.9 H    (45.0-80.0)  %


 


Lymph % (Auto)  11.5    (10.0-50.0)  %


 


Mono % (Auto)  4.5    (2.0-14.0)  %


 


Eos % (Auto)  0.0    (0.0-5.0)  %


 


Baso % (Auto)  0.1    (0.0-2.0)  %


 


Neut # (Auto)  7.35 H    (1.40-7.00)  K/uL


 


Lymph # (Auto)  1.01    (0.50-3.50)  K/uL


 


Mono # (Auto)  0.39    (0.00-1.00)  K/uL


 


Eos # (Auto)  0.00    (0.00-0.50)  K/uL


 


Baso # (Auto)  0.01    (0.00-0.20)  K/uL


 


PT   10.7   (9.5-12.0)  SEC


 


INR   1.1   


 


Sodium    140  (136-145)  mmol/L


 


Potassium    3.8  (3.5-5.1)  mmol/L


 


Chloride    105  ()  mmol/L


 


Carbon Dioxide    26.6  (21.0-32.0)  mmol/L


 


Anion Gap    8.4  (7-15)  meq/L


 


BUN    21 H  (7-18)  mg/dL


 


Creatinine    1.17  (0.51-1.17)  mg/dL


 


Est Cr Clr Drug Dosing    56.23  mL/min


 


Estimated GFR (MDRD)    > 60  mL/min


 


Glucose    84  (70-99)  mg/dL


 


Calcium    8.4 L  (8.5-10.1)  mg/dL


 


Total Bilirubin    1.0  (0.2-1.0)  mg/dL


 


Direct Bilirubin    0.3 H  (0.0-0.2)  mg/dL


 


AST    53 H  (15-37)  U/L


 


ALT    48  (12-78)  U/L


 


Alkaline Phosphatase    53  ()  IU/L


 


Total Protein    6.8  (6.4-8.2)  g/dL


 


Albumin    3.2 L  (3.4-5.0)  g/dL











Med Orders - Current: 


                               Current Medications





Acetaminophen (Acetaminophen 325 Mg Tab)  650 mg PO Q4H PRN


   PRN Reason: analgesia/fever


Albuterol/Ipratropium (Albuterol/Ipratropium 4 Gm Inhalation Spray)  1 gm INH 

QID [facility]


   Last Admin: 09/21/21 12:13 Dose:  1 inhalation


   Documented by: 


Amlodipine Besylate (Amlodipine 5 Mg Tab)  10 mg PO DAILY [facility]


   Last Admin: 09/21/21 08:21 Dose:  10 mg


   Documented by: 


Benzonatate (Benzonatate 100 Mg Cap)  100 mg PO TID@0800,1400,2000 [facility]


   Last Admin: 09/21/21 08:23 Dose:  100 mg


   Documented by: 


Calcium Carbonate/Glycine (Calcium Carbonate 500 Mg Tab.Chew)  500 mg PO Q4H PRN


   PRN Reason: Dyspepsia


Dexamethasone (Dexamethasone 2 Mg Tab)  6 mg PO DAILY [facility]


   Last Admin: 09/21/21 08:24 Dose:  6 mg


   Documented by: 


Enoxaparin Sodium (Enoxaparin 40 Mg/0.4 Ml Syringe)  40 mg SUBCUT Q12HR [facility]


   Last Admin: 09/21/21 08:21 Dose:  40 mg


   Documented by: 


Fluticasone Propionate (Fluticasone Propionate Nasal Spray 16 Gm Bottle)  0 gm 

NASBOTH BEDTIME [facility]


   Last Admin: 09/20/21 19:39 Dose:  2 spray


   Documented by: 


Guaifenesin (Guaifenesin 600 Mg Tab.Er)  600 mg PO Q12HR [facility]


   Last Admin: 09/21/21 08:22 Dose:  600 mg


   Documented by: 


Remdesivir 100 mg/ Sodium (Chloride)  100 mls @ 100 mls/hr IV Q24H [facility]


   Stop: 09/24/21 15:59


Isosorbide Mononitrate (Isosorbide Mononitrate 30 Mg Tab.Er)  30 mg PO DAILY [facility]


   Last Admin: 09/21/21 08:23 Dose:  30 mg


   Documented by: 


Meloxicam (Meloxicam 15 Mg Tab)  15 mg PO DAILY [facility]


   Last Admin: 09/21/21 08:22 Dose:  15 mg


   Documented by: 


Nitroglycerin (Nitroglycerin 0.4 Mg Tab.Sl)  0.4 mg SL ASDIRECTED PRN


   PRN Reason: Chest Pain


Omeprazole (Omeprazole 20 Mg Cap.Cr)  20 mg PO DAILY [facility]


   Last Admin: 09/21/21 08:23 Dose:  20 mg


   Documented by: 


Pramipexole Dihydrochloride (Pramipexole 1 Mg Tab)  1 mg PO BID@0800,2000 [facility]


   Last Admin: 09/21/21 08:23 Dose:  1 mg


   Documented by: 


Rosuvastatin Calcium (Rosuvastatin 10 Mg Tab)  20 mg PO DAILY [facility]


   Last Admin: 09/21/21 08:22 Dose:  20 mg


   Documented by: 


Sodium Chloride (Sodium Chloride 0.9% 10 Ml Syringe)  30 ml FLUSH ASDIRECTED [facility]





Discontinued Medications





Enoxaparin Sodium (Enoxaparin 40 Mg/0.4 Ml Syringe)  40 mg SUBCUT ONETIME ONE


   Stop: 09/20/21 15:01


   Last Admin: 09/20/21 15:13 Dose:  40 mg


   Documented by: 


Guaifenesin (Guaifenesin 600 Mg Tab.Er)  600 mg PO ONETIME ONE


   Stop: 09/20/21 16:01


   Last Admin: 09/20/21 16:27 Dose:  600 mg


   Documented by: 


Bamlanivimab 700 mg/Etesevimab 1,400 mg/ Sodium Chloride  310 mls @ 310 mls/hr 

IV ONETIME ONE


   Stop: 09/20/21 12:29


   Last Admin: 09/20/21 14:17 Dose:  Not Given


   Documented by: 


Remdesivir 200 mg/ Sodium (Chloride)  250 mls @ 250 mls/hr IV ONETIME ONE


   Stop: 09/20/21 15:59


   Last Admin: 09/20/21 15:13 Dose:  250 mls/hr


   Documented by: 











- Exam


Quality Assessment: Supplemental Oxygen (15L), DVT Prophylaxis


General: Alert, Oriented, Cooperative, No Acute Distress


HEENT: Pupils Equal, Pupils Reactive, EOMI


Neck: Supple


Lungs: Clear to Auscultation, Normal Respiratory Effort, Other (No 

wheezes/rales/rhonchi noted on exam)


Cardiovascular: Regular Rate, Regular Rhythm, No Murmurs


GI/Abdominal Exam: Soft, Non-Tender, No Distention


 (Male) Exam: Deferred


Back Exam: No: CVA Tenderness (L), CVA Tenderness (R), Muscle Spasm


Extremities: Non-Tender, No Pedal Edema, Normal Capillary Refill


Skin: Warm, Dry


Neurological: No New Focal Deficit


Psy/Mental Status: Alert, Normal Affect, Normal Mood





- Patient Data


Lab Results Last 24 hrs: 


                         Laboratory Results - last 24 hr











  09/20/21 09/20/21 09/20/21 Range/Units





  14:20 14:20 14:20 


 


WBC  8.8    (4.0-10.2)  K/uL


 


RBC  4.18 L    (4.33-5.41)  M/uL


 


Hgb  12.7 L    (13.1-16.8)  g/dL


 


Hct  38.4 L    (39.0-49.0)  %


 


MCV  91.9    (84.0-98.0)  fL


 


MCH  30.4    (28.2-33.3)  pg


 


MCHC  33.1    (31.7-36.0)  g/dL


 


RDW  15.7 H    (11.2-14.1)  %


 


Plt Count  142 L    (150-350)  K/uL


 


Neut % (Auto)  83.9 H    (45.0-80.0)  %


 


Lymph % (Auto)  11.5    (10.0-50.0)  %


 


Mono % (Auto)  4.5    (2.0-14.0)  %


 


Eos % (Auto)  0.0    (0.0-5.0)  %


 


Baso % (Auto)  0.1    (0.0-2.0)  %


 


Neut # (Auto)  7.35 H    (1.40-7.00)  K/uL


 


Lymph # (Auto)  1.01    (0.50-3.50)  K/uL


 


Mono # (Auto)  0.39    (0.00-1.00)  K/uL


 


Eos # (Auto)  0.00    (0.00-0.50)  K/uL


 


Baso # (Auto)  0.01    (0.00-0.20)  K/uL


 


PT   10.7   (9.5-12.0)  SEC


 


INR   1.1   


 


Sodium    140  (136-145)  mmol/L


 


Potassium    3.8  (3.5-5.1)  mmol/L


 


Chloride    105  ()  mmol/L


 


Carbon Dioxide    26.6  (21.0-32.0)  mmol/L


 


Anion Gap    8.4  (7-15)  meq/L


 


BUN    21 H  (7-18)  mg/dL


 


Creatinine    1.17  (0.51-1.17)  mg/dL


 


Est Cr Clr Drug Dosing    56.23  mL/min


 


Estimated GFR (MDRD)    > 60  mL/min


 


Glucose    84  (70-99)  mg/dL


 


Calcium    8.4 L  (8.5-10.1)  mg/dL


 


Total Bilirubin    1.0  (0.2-1.0)  mg/dL


 


Direct Bilirubin    0.3 H  (0.0-0.2)  mg/dL


 


AST    53 H  (15-37)  U/L


 


ALT    48  (12-78)  U/L


 


Alkaline Phosphatase    53  ()  IU/L


 


Total Protein    6.8  (6.4-8.2)  g/dL


 


Albumin    3.2 L  (3.4-5.0)  g/dL











Result Diagrams: 


                                 09/20/21 14:20





                                 09/20/21 14:20





Sepsis Event Note





- Evaluation


Sepsis Screening Result: No Definite Risk





- Focused Exam


Vital Signs: 


                                   Vital Signs











  Temp Pulse Resp BP BP BP Pulse Ox


 


 09/21/21 12:00  36.7 C  62  32 H    98/52 L  83 L


 


 09/21/21 08:23     122/63   


 


 09/21/21 08:21     122/63   


 


 09/21/21 08:00  36.6 C  61  32 H   122/63   88 L


 


 09/21/21 04:00        91 L














- Problem List & Annotations


(1) COVID-19


SNOMED Code(s): 196328732


   Code(s): U07.1 - COVID-19   Status: Acute   Priority: High   Current Visit: 

No   Annotation/Comment:: Acute Covid-19 infection. Receiving Dexamethasone. IV 

Bamlanivimab and Remdesivir.  Supplemental O2.    





(2) Hypoxemia requiring supplemental oxygen


SNOMED Code(s): 965311470


   Code(s): R09.02 - HYPOXEMIA; Z99.81 - DEPENDENCE ON SUPPLEMENTAL OXYGEN   

Status: Acute   Priority: High   Current Visit: Yes   Annotation/Comment:: 

Currently receiving 15L via [facility].  Stable O2 requirement. RR 32.  O2 sats in high

80s with 91% recently.    





(3) Essential hypertension


SNOMED Code(s): 31075090


   Code(s): I10 - ESSENTIAL (PRIMARY) HYPERTENSION   Status: Chronic   Priority:

Medium   Current Visit: Yes   Annotation/Comment:: Has been running a bit lower 

BPs since admission.  /50s.  Will hold some of patient's BP meds and 

continue to observe trends.    





(4) Coronary artery disease


SNOMED Code(s): 66013952


   Code(s): I25.10 - ATHSCL HEART DISEASE OF NATIVE CORONARY ARTERY W/O ANG 

PCTRS   Status: Chronic   Priority: Low   Current Visit: No   


Qualifiers: 


   Coronary Disease-Associated Artery/Lesion type: bypass graft   Native vs. tra

nsplanted heart: native heart   Associated angina: unspecified whether angina 

present   Qualified Code(s): I25.810 - Atherosclerosis of coronary artery bypass

graft(s) without angina pectoris   


Annotation/Comment:: No chest pain/anginal complaints.    





(5) GERD (gastroesophageal reflux disease)


SNOMED Code(s): 828379870


   Code(s): K21.9 - GASTRO-ESOPHAGEAL REFLUX DISEASE WITHOUT ESOPHAGITIS   

Status: Chronic   Priority: Low   Current Visit: No   


Qualifiers: 


   Esophagitis presence: esophagitis presence not specified   Qualified Code(s):

K21.9 - Gastro-esophageal reflux disease without esophagitis   


Annotation/Comment:: Stable by history   





(6) Sleep apnea


SNOMED Code(s): 67427597


   Code(s): G47.30 - SLEEP APNEA, UNSPECIFIED   Status: Acute   Priority: Low   

Current Visit: No   


Qualifiers: 


   Sleep apnea type: unspecified type   Qualified Code(s): G47.30 - Sleep apnea,

unspecified   


Annotation/Comment:: Has CPAP device with him.     





(7) Hyperlipemia


SNOMED Code(s): 88127654


   Code(s): E78.5 - HYPERLIPIDEMIA, UNSPECIFIED   Status: Chronic   Priority: 

Low   Current Visit: No   


Qualifiers: 


   Hyperlipidemia type: unspecified   Qualified Code(s): E78.5 - Hyperlipidemia,

unspecified   


Annotation/Comment:: Under therapy   





(8) Osteoarthritis


SNOMED Code(s): 384365306


   Code(s): M19.90 - UNSPECIFIED OSTEOARTHRITIS, UNSPECIFIED SITE   Status: 

Chronic   Priority: Low   Current Visit: No   


Qualifiers: 


   Osteoarthritis location: unspecified site 


Annotation/Comment:: Stable by history   





(9) Fibromyalgia


SNOMED Code(s): 531408916


   Code(s): M79.7 - FIBROMYALGIA   Status: Chronic   Priority: Low   Current 

Visit: No   Annotation/Comment:: stable by history   





(10) Hearing loss


SNOMED Code(s): 51111724


   Code(s): H91.90 - UNSPECIFIED HEARING LOSS, UNSPECIFIED EAR   Status: Chronic

  Priority: Low   Current Visit: No   


Qualifiers: 


   Hearing loss type: unspecified 





(11) Obesity


SNOMED Code(s): 218262494, 317042519


   Code(s): E66.9 - OBESITY, UNSPECIFIED   Status: Chronic   Priority: Low   

Current Visit: No   


Qualifiers: 


   Obesity type: unspecified obesity type 





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


SNOMED Code(s): 55109896


   Code(s): M33.20 - POLYMYOSITIS, ORGAN INVOLVEMENT UNSPECIFIED   Status: Acute

  Current Visit: Yes   





(13) PTSD (post-traumatic stress disorder)


SNOMED Code(s): 29594626


   Code(s): F43.10 - POST-TRAUMATIC STRESS DISORDER, UNSPECIFIED   Status: 

Chronic   Priority: Low   Current Visit: No   Annotation/Comment:: Stable by 

history   





(14) RLS (restless legs syndrome)


SNOMED Code(s): 19515323


   Code(s): G25.81 - RESTLESS LEGS SYNDROME   Status: Chronic   Priority: Low   

Current Visit: No   Annotation/Comment:: stable by history   





- Problem List Review


Problem List Initiated/Reviewed/Updated: Yes





- Assessment


Assessment:: 





as above





- Plan


Plan:: 


As above.


Patient continues to require 15L of supplemental oxygen.  Nursing staff will see

if he can be weaned down a bit and maintain O2 sats in high 80s.  Continue 

Dexamethasone. Observe BP trends. Uncertain how many days patient will require 

inpatient treatment.  Anticipate minimum of 3-4 days additional inpatient stay 

given severity of Covid related symptoms. Recheck labs and chest xray in AM.